# Patient Record
Sex: FEMALE | Race: WHITE | NOT HISPANIC OR LATINO | URBAN - METROPOLITAN AREA
[De-identification: names, ages, dates, MRNs, and addresses within clinical notes are randomized per-mention and may not be internally consistent; named-entity substitution may affect disease eponyms.]

---

## 2017-06-06 ENCOUNTER — ALLSCRIPTS OFFICE VISIT (OUTPATIENT)
Dept: OTHER | Facility: OTHER | Age: 32
End: 2017-06-06

## 2017-06-06 LAB — HCG, QUALITATIVE (HISTORICAL): NEGATIVE

## 2017-06-06 PROCEDURE — 87624 HPV HI-RISK TYP POOLED RSLT: CPT | Performed by: NURSE PRACTITIONER

## 2017-06-06 PROCEDURE — G0145 SCR C/V CYTO,THINLAYER,RESCR: HCPCS | Performed by: NURSE PRACTITIONER

## 2017-06-07 ENCOUNTER — LAB REQUISITION (OUTPATIENT)
Dept: LAB | Facility: HOSPITAL | Age: 32
End: 2017-06-07
Payer: COMMERCIAL

## 2017-06-07 DIAGNOSIS — Z01.419 ENCOUNTER FOR GYNECOLOGICAL EXAMINATION WITHOUT ABNORMAL FINDING: ICD-10-CM

## 2017-06-07 DIAGNOSIS — Z11.51 ENCOUNTER FOR SCREENING FOR HUMAN PAPILLOMAVIRUS (HPV): ICD-10-CM

## 2017-06-08 ENCOUNTER — GENERIC CONVERSION - ENCOUNTER (OUTPATIENT)
Dept: OTHER | Facility: OTHER | Age: 32
End: 2017-06-08

## 2017-06-12 LAB — HPV RRNA GENITAL QL NAA+PROBE: NORMAL

## 2017-06-14 LAB
LAB AP GYN PRIMARY INTERPRETATION: NORMAL
LAB AP LMP: NORMAL
Lab: NORMAL

## 2017-06-16 ENCOUNTER — GENERIC CONVERSION - ENCOUNTER (OUTPATIENT)
Dept: OTHER | Facility: OTHER | Age: 32
End: 2017-06-16

## 2017-12-06 ENCOUNTER — ALLSCRIPTS OFFICE VISIT (OUTPATIENT)
Dept: OTHER | Facility: OTHER | Age: 32
End: 2017-12-06

## 2017-12-07 NOTE — PROGRESS NOTES
Assessment    1  Encounter for preconception consultation (V26 49) (Z31 69)    Plan  Given clomid info- will call if she decides, Will check on fertility specialists by [facility]  Discussion/Summary  Goals and Barriers: The patient has the current Goals: To become pregnant  The patent has the current Barriers: Stressful jobs  Patient's Capacity to Self-Care: Patient is able to Self-Care  Medication SE Review and Pt Understands Tx: Possible side effects of new medications were reviewed with the patient/guardian today  The treatment plan was reviewed with the patient/guardian  The patient/guardian understands and agrees with the treatment plan   Self Referrals:   Self Referrals: Yes   HM Female, Adult: health maintenance visit Currently, she eats an adequate diet  cervical cancer screening is current cervical cancer screening is needed every three years next cervical cancer screening is due 2019 Breast cancer screening: breast cancer screening is not indicated  Colorectal cancer screening: colorectal cancer screening is not indicated  Osteoporosis screening: bone mineral density testing is not indicated  Advice and education were given regarding calcium supplements, vitamin D supplements and reproductive health  Patient Education Record:   PATIENT EDUCATION RECORD  She is ready to learn  She has no barriers to learning  Chief Complaint  Chief Complaint Free Text Note Form: Patient here for a 6 month follow up on periods  Chief Complaint Chronic Condition [facility]: Patient is here today for follow up of chronic conditions described in HPI  History of Present Illness  HPI: Patient is a 39-year-old premenopausal female here for fertility questions  She has been trying more aggressively for pregnancy off and on for 2 years She has been with her partner for 3 years  She denies history of abnormal Pap smears  She denies vaginal symptoms   She has seen Dr Gold Pruett in the past for overactive bladder and Incontinence and has been treated with Botox,InterStim and medication but she has put these on hold until after pregnancy  Not sure she wants to try clomid  Reports monthly, regular menses and mittleschmertz  GYN HM, Adult Female [facility]: The patient is being seen for a gynecology evaluation  The last health maintenance visit was 1 year(s) ago  General Health: The patient's health since the last visit is described as good  Lifestyle:  She exercises regularly  -- She does not use tobacco  The patient has never smoked cigarettes  -- She consumes alcohol  She reports occasional alcohol use  Reproductive health: the patient is premenopausal--   she reports no menstrual problems  Menstrual history:  age at menarche was 14  LMP: the last menstrual period was 5/2/17  Recent menstrual periods: bleeding has been normal  The cycles have been regular  The duration of her recent periods has been regular-- and-- usually last 5 days  -- she uses no contraception  -- she is sexually active  -- she denies prior pregnancies G 0P 0  Screening: Cervical cancer screening includes a pap smear performed 5/2016 per pt  Review of Systems  Focused-Female:  Constitutional: No fever, no chills, feels well, no tiredness, no recent weight gain or loss  Breasts: no complaints of breast pain, breast lump or nipple discharge  Gastrointestinal: constipation, but-- no abdominal pain,-- no nausea,-- no vomiting,-- no diarrhea-- and-- no blood in stools  Genitourinary: incontinence-- and-- OAB, but-- no dysuria,-- no pelvic pain,-- no vaginal discharge,-- no dysmenorrhea-- and-- no unexplained vaginal bleeding  ROS Reviewed:   ROS reviewed  Active Problems  1  Encounter for preconception consultation (V26 49) (Z31 69)   2  Late menses (626 8) (N92 6)    Past Medical History  1  History of Anxiety (300 00) (F41 9)   2  History of cardiac disorder (V12 50) (Z86 79)   3  History of carpal tunnel syndrome (V12 49) (Z86 69)   4   History of urinary incontinence (V13 09) (Z87 898)   5  History of Irregular bleeding (626 4) (N92 6)  Active Problems And Past Medical History Reviewed: The active problems and past medical history were reviewed and updated today  Surgical History  1  History of Neuroplasty Median Nerve At Carpal Tunnel   2  History of Oral Surgery Tooth Extraction [place] Tooth   3  History of Tonsillectomy  Surgical History Reviewed: The surgical history was reviewed and updated today  Family History  Mother    1  Family history of malignant neoplasm (V16 9) (Z80 9)  Maternal Grandmother    2  Family history of malignant neoplasm of breast (V16 3) (Z80 3)  Maternal Grandfather    3  Family history of Melanoma  Maternal Relatives    4  Family history of malignant neoplasm (V16 9) (Z80 9)  Family History Reviewed: The family history was reviewed and updated today  Patient denies family history of GYN or colon cancers      Social History     · Never a smoker   · Sexually active   · Social alcohol use (Z78 9)  Social History Reviewed: The social history was reviewed and updated today  Patient works 3 jobs one of which is a full-time job at [place] [address]   1  Biotin TABS; Therapy: (Recorded:06Jun2017) to Recorded   2  Prenatal TABS; Therapy: (Recorded:06Jun2017) to Recorded  Medication List Reviewed: The medication list was reviewed and updated today  Allergies  1  No Known Drug Allergies    Vitals  Vital Signs    Recorded: [id number] 37:25LO   Systolic 876, LUE, Sitting   Diastolic 82, LUE, Sitting   Height 5 ft 4 in   Weight 215 lb    BMI Calculated 36 9   BSA Calculated 2 02   LMP [id number]       Physical Exam   Constitutional  General appearance: No acute distress, well appearing and well nourished  Pulmonary  Respiratory effort: No increased work of breathing or signs of respiratory distress     Psychiatric  Orientation to person, place, and time: Normal    Mood and affect: Normal  Signatures   Electronically signed by : AZ Barrera; Dec  6 2017  1:04PM EST                       (Author)    Electronically signed by : Jolynn Hare MD; Dec  6 2017  1:10PM EST

## 2018-01-12 NOTE — RESULT NOTES
Verified Results  (1) THIN PREP PAP WITH IMAGING [id number] 02:54PM Precilla Guruwer     Test Name Result Flag Reference   LAB AP CASE REPORT (Report)     Gynecologic Cytology Report            Case: [id number]                  Authorizing Provider: AZ Barrera    Collected:      06/06/2017           First Screen:     NAVEED Tenorio    Received:      06/09/2017 9672        Rescreen:       NAVEED Calvo                             Specimen:  LIQUID-BASED PAP, SCREENING, Cervix   HPV HIGH RISK RESULT (Report)     HPV, High Risk: HPV NEG, HPV16 NEG, HPV18 NEG      Other High Risk HPV Negative, HPV 16 Negative, HPV 18 Negative  HPV types: 16,18,31,33,35,39,45,51,52,56,58,59,66 and 68 DNA are undetectable or below the pre-set threshold  Roche???s FDA approved Jw 4800 is utilized with strict adherence to the ???s instruction  manual to test for the presence of High-Risk HPV DNA, as well as HPV 16 and HPV 18  This instrument  has been validated by our laboratory and/or by the   A negative result does not preclude the presence of HPV infection because results depend on adequate  specimen collection, absence of inhibitors and sufficient DNA to be detected  Additionally, HPV negative  results are not intended to prevent women from proceeding to colposcopy if clinically warranted  Positive HPV test results indicate the presence of any one or more of the high risk types, but since patients  are often co-infected with low-risk types it does not rule out the presence of low-risk types in patients  with mixed infections  LAB AP GYN PRIMARY INTERPRETATION      Negative for intraepithelial lesion or malignancy  Electronically signed by NVAEED Calvo on 6/14/2017 at 2:54 PM   LAB AP GYN SPECIMEN ADEQUACY      Satisfactory for evaluation  Endocervical/transformation zone component present     LAB AP GYN ADDITIONAL INFORMATION (Report)     [employer]'s FDA approved ,  and ThinPrep Imaging System are   utilized with strict adherence to the 's instruction manual to   prepare gynecologic and non-gynecologic cytology specimens for the   production of ThinPrep slides as well as for gynecologic ThinPrep imaging  These processes have been validated by our laboratory and/or by the     The Pap test is not a diagnostic procedure and should not be used as the   sole means to detect cervical cancer  It is only a screening procedure to   aid in the detection of cervical cancer and its precursors  Both   false-negative and false-positive results have been experienced  Your   patient's test result should be interpreted in this context together with   the history and clinical findings     LAB AP [facility] 5/2/2017

## 2018-01-14 VITALS
HEIGHT: 64 IN | BODY MASS INDEX: 39.27 KG/M2 | DIASTOLIC BLOOD PRESSURE: 80 MMHG | WEIGHT: 230 LBS | SYSTOLIC BLOOD PRESSURE: 122 MMHG

## 2018-01-16 NOTE — RESULT NOTES
Message  Records review from [address] [place] [address]:  Pap normal April 2015, HPV negative  Normal Paps in 2014, 2012, 2011  Had issue with daily headaches and was asked to take Jillian continuously for 4 packs  G0

## 2018-01-22 VITALS
DIASTOLIC BLOOD PRESSURE: 82 MMHG | SYSTOLIC BLOOD PRESSURE: 130 MMHG | WEIGHT: 215 LBS | HEIGHT: 64 IN | BODY MASS INDEX: 36.7 KG/M2